# Patient Record
Sex: MALE
[De-identification: names, ages, dates, MRNs, and addresses within clinical notes are randomized per-mention and may not be internally consistent; named-entity substitution may affect disease eponyms.]

---

## 2018-03-26 ENCOUNTER — HOSPITAL ENCOUNTER (EMERGENCY)
Dept: HOSPITAL 31 - C.ER | Age: 28
Discharge: HOME | End: 2018-03-26
Payer: COMMERCIAL

## 2018-03-26 VITALS
DIASTOLIC BLOOD PRESSURE: 77 MMHG | HEART RATE: 101 BPM | SYSTOLIC BLOOD PRESSURE: 122 MMHG | RESPIRATION RATE: 20 BRPM | TEMPERATURE: 99 F

## 2018-03-26 VITALS — OXYGEN SATURATION: 98 %

## 2018-03-26 DIAGNOSIS — R07.9: Primary | ICD-10-CM

## 2018-03-26 DIAGNOSIS — R00.2: ICD-10-CM

## 2018-03-26 LAB
ALBUMIN SERPL-MCNC: 4.6 G/DL (ref 3.5–5)
ALBUMIN/GLOB SERPL: 1.2 {RATIO} (ref 1–2.1)
ALT SERPL-CCNC: 49 U/L (ref 21–72)
AST SERPL-CCNC: 44 U/L (ref 17–59)
BASOPHILS # BLD AUTO: 0.2 K/UL (ref 0–0.2)
BASOPHILS NFR BLD: 1.7 % (ref 0–2)
BUN SERPL-MCNC: 14 MG/DL (ref 9–20)
CALCIUM SERPL-MCNC: 9.3 MG/DL (ref 8.6–10.4)
EOSINOPHIL # BLD AUTO: 0.3 K/UL (ref 0–0.7)
EOSINOPHIL NFR BLD: 3.4 % (ref 0–4)
ERYTHROCYTE [DISTWIDTH] IN BLOOD BY AUTOMATED COUNT: 13 % (ref 11.5–14.5)
GFR NON-AFRICAN AMERICAN: > 60
HGB BLD-MCNC: 17.2 G/DL (ref 12–18)
LYMPHOCYTES # BLD AUTO: 3.1 K/UL (ref 1–4.3)
LYMPHOCYTES NFR BLD AUTO: 30.2 % (ref 20–40)
MCH RBC QN AUTO: 29.5 PG (ref 27–31)
MCHC RBC AUTO-ENTMCNC: 34.4 G/DL (ref 33–37)
MCV RBC AUTO: 86 FL (ref 80–94)
MONOCYTES # BLD: 0.7 K/UL (ref 0–0.8)
MONOCYTES NFR BLD: 7 % (ref 0–10)
NEUTROPHILS # BLD: 5.8 K/UL (ref 1.8–7)
NEUTROPHILS NFR BLD AUTO: 57.7 % (ref 50–75)
NRBC BLD AUTO-RTO: 0.1 % (ref 0–2)
PLATELET # BLD: 377 K/UL (ref 130–400)
PMV BLD AUTO: 8.6 FL (ref 7.2–11.7)
RBC # BLD AUTO: 5.82 MIL/UL (ref 4.4–5.9)
WBC # BLD AUTO: 10.1 K/UL (ref 4.8–10.8)

## 2018-03-26 PROCEDURE — 85378 FIBRIN DEGRADE SEMIQUANT: CPT

## 2018-03-26 PROCEDURE — 80053 COMPREHEN METABOLIC PANEL: CPT

## 2018-03-26 PROCEDURE — 71046 X-RAY EXAM CHEST 2 VIEWS: CPT

## 2018-03-26 PROCEDURE — 85025 COMPLETE CBC W/AUTO DIFF WBC: CPT

## 2018-03-26 PROCEDURE — 71275 CT ANGIOGRAPHY CHEST: CPT

## 2018-03-26 PROCEDURE — 96360 HYDRATION IV INFUSION INIT: CPT

## 2018-03-26 PROCEDURE — 84484 ASSAY OF TROPONIN QUANT: CPT

## 2018-03-26 PROCEDURE — 99284 EMERGENCY DEPT VISIT MOD MDM: CPT

## 2018-03-26 NOTE — C.PDOC
History Of Present Illness


26 y/o female presents to ER if c/o of midsternal chest pain described as 

tightness/ pressure which lasts 10 seconds intermittently x5 days. Pt dnies URI 

sx, SOB, fever. Pt reports recent travel on 3/1 9 flew to [place] from [place] 

otherwise no recent immobility state, leg cast, trauma, or other coagulability 

state, Pt is now pain free 


Time Seen by Provider: 18 01:30


Chief Complaint (Nursing): Chest Pain


History Per: Patient


History/Exam Limitations: no limitations


Current Symptoms Are (Timing): Gone


Quality: Tightness


Associated Symptoms: denies: Nausea, Dyspnea, Diaphoresis, Syncope


Exacerbating Factors: None


Recent travel outside of the [place]: Yes (1 month ago to [place])





Past Medical History


Vital Signs: 


 Last Vital Signs











Temp  99.5 F   18 03:03


 


Pulse  118 H  18 03:03


 


Resp  24   18 03:03


 


BP  129/85   18 03:03


 


Pulse Ox  98   18 05:01














- Medical History


PMH: No Chronic Diseases


Family History: States: Unknown Family Hx





- Social History


Hx Alcohol Use: Yes


Hx Substance Use: No





- Immunization History


Hx Tetanus Toxoid Vaccination: No


Hx Influenza Vaccination: No


Hx Pneumococcal Vaccination: No





Review Of Systems


Constitutional: Negative for: Fever


Cardiovascular: Positive for: Chest Pain, Palpitations.  Negative for: Light 

Headedness


Respiratory: Negative for: Cough, Shortness of Breath, Wheezing


Gastrointestinal: Negative for: Nausea, Vomiting, Abdominal Pain





Physical Exam





- Physical Exam


Appears: Well, Non-toxic


Eye(s): bilateral: Normal Inspection, PERRL


Chest: Symmetrical, No Tenderness


Cardiovascular: Rhythm Regular, No Murmur


Respiratory: Normal Breath Sounds, No Rhonchi, No Wheezing


Gastrointestinal/Abdominal: Normal Exam


Back: Normal Inspection, No CVA Tenderness


Extremity: Normal ROM, No Swelling


Extremity: Bilateral: Atraumatic


Neurological/Psych: Oriented x3


Gait: Steady





ED Course And Treatment





- Laboratory Results


Result Diagrams: 


 18 02:05





 18 02:05


ECG: Interpreted By Me


ECG Rhythm: Sinus Tachycardia (at 128), Nonspecific Changes


ECG Interpretation: No Acute Changes


O2 Sat by Pulse Oximetry: 98


Pulse Ox Interpretation: Normal





- Radiology


CXR Interpretation: Yes: No Acute Disease.  No: Infiltrates, Cardiomegaly, 

Pnemothorax





- CT Scan/US


  ** CTA chest


Other Rad Studies (CT/US): Read By Radiologist, Radiology Report Reviewed


CT/US Interpretation: FINDINGS:  Limitations: Suboptimal timing of bolus.  

Pulmonary arteries: No definite pulmonary embolism.  Aorta: No aneurysm. No 

dissection.  Lungs: No consolidation.  Pleural space: No significant effusion. 

No pneumothorax.  Heart: No cardiomegaly. No significant pericardial effusion.  

Bones/joints: No acute fracture.  Soft tissues: Minimal gynecomastia.  Lymph 

nodes: No pathologically enlarged lymph nodes.  Liver: Fatty infiltration.  

IMPRESSION:  1. No definite CT evidence of pulmonary embolism.  2. Incidental/

non-acute findings are described above.  Thank you for allowing us to 

participate in the care of your patient.  Dictated and Authenticated by: Rohan Shin MD.  2018 4:58 AM Eastern Time ([place] & [place])


Progress Note: Labs reviewed - Dimer slight elevation, pt remained stable with 

100% oxugen on RA. CTA chest ordered to r/o PE.  On reeval pt remined stable, 

still tachy at 119, improved from initial. CTA chest results d/w pt and pt 

advised to take tylenol or advil for pain, may continue xanax if nervous or 

anxious. Pt understands and agrees with plan.  Case d/w Dr Quick who agreed pt is 

stable for discharge


Reassessment Condition: Improved





Medical Decision Making


Medical Decision Makin y/o male obese with CP x 5 days- recent travel 


Labs incl trop and dimer, ekg, cxr


2- elev dimer 





CTA chest





Reviewed no PE, pt is pain free, vitals remained stable and pt will follow up 

with pMD





Disposition


Counseled Patient/Family Regarding: Diagnosis, Need For Followup, Rx Given





- Disposition


Referrals: 


[facility] [Outside]


Disposition: HOME/ ROUTINE


Disposition Time: 05:27


Condition: STABLE


Additional Instructions: 


Take tylenol or motrin for pain





May take xanax if anxious





Follow up with PMD or in clinic





Return to ER if chest pan, vomiting, difficulty breathing passingsout or worse 


Instructions:  Chest Pain (DC)


Forms:  [facility] (English)





- Clinical Impression


Clinical Impression: 


 Chest pain, Heart palpitations

## 2018-03-26 NOTE — CT
EXAM:

  CT Angiography Chest With Intravenous Contrast



CLINICAL HISTORY:

  27 years old, male; Pain; Chest pain; Additional info: Chest pain, tachycardia



TECHNIQUE:

  Axial computed tomographic angiography images of the chest with intravenous 

contrast using pulmonary embolism protocol.  All CT scans at this facility use 

one or more dose reduction techniques, viz.: automated exposure control; ma/kV 

adjustment per patient size (including targeted exams where dose is matched to 

indication; i.e. head); or iterative reconstruction technique.

  MIP reconstructed images were created and reviewed.

  Coronal and sagittal reformatted images were created and reviewed.



CONTRAST:

  100 mL of 100ml [id number] administered intravenously.



COMPARISON:

  No relevant prior studies available.



FINDINGS:

  Limitations:  Suboptimal timing of bolus.

  Pulmonary arteries:  No definite pulmonary embolism.

  Aorta:  No aneurysm.  No dissection.

  Lungs:  No consolidation.

  Pleural space:  No significant effusion.  No pneumothorax.

  Heart:  No cardiomegaly.  No significant pericardial effusion.

  Bones/joints:  No acute fracture.

  Soft tissues:  Minimal gynecomastia.

  Lymph nodes:  No pathologically enlarged lymph nodes.

  Liver:  Fatty infiltration.



IMPRESSION:     

1.  No definite CT evidence of pulmonary embolism.

2.  Incidental/non-acute findings are described above.

## 2018-03-28 NOTE — CARD
--------------- APPROVED REPORT --------------





EKG Measurement

Heart [id number]

IL [id number]

[id number]

[id number]

[id number]



<Conclusion>

Sinus tachycardia

Left axis deviation

Minimal voltage criteria for LVH, may be normal variant

Abnormal ECG